# Patient Record
Sex: MALE | Race: WHITE | NOT HISPANIC OR LATINO | Employment: STUDENT | URBAN - METROPOLITAN AREA
[De-identification: names, ages, dates, MRNs, and addresses within clinical notes are randomized per-mention and may not be internally consistent; named-entity substitution may affect disease eponyms.]

---
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
## 2024-03-25 ENCOUNTER — TELEPHONE (OUTPATIENT)
Dept: FAMILY MEDICINE | Facility: CLINIC | Age: 19
End: 2024-03-25
Payer: MEDICAID

## 2024-03-25 NOTE — TELEPHONE ENCOUNTER
----- Message from Clarissa Sarah sent at 3/25/2024  8:16 AM CDT -----  Contact: Isadora  Type:  Same Day Appointment Request    Caller is requesting a same day appointment.  Caller declined first available appointment listed below.      Name of Caller:  Isadora/ PT Mother  When is the first available appointment?  April w/ any provider  Symptoms:  Symptoms: Cough, Colds  Outcome: Schedule an appointment to be seen within 24 hours.  Reason: Caller denied all higher acuity questions  Best Call Back Number:  [phone]  Additional Information:   Asking to be seen today or tomorrow if possible. PT wa last seen by Dr. Mitchell